# Patient Record
Sex: MALE | Race: WHITE | Employment: OTHER | URBAN - METROPOLITAN AREA
[De-identification: names, ages, dates, MRNs, and addresses within clinical notes are randomized per-mention and may not be internally consistent; named-entity substitution may affect disease eponyms.]

---

## 2019-09-20 ENCOUNTER — APPOINTMENT (OUTPATIENT)
Dept: GENERAL RADIOLOGY | Age: 64
End: 2019-09-20
Attending: EMERGENCY MEDICINE
Payer: MEDICARE

## 2019-09-20 ENCOUNTER — HOSPITAL ENCOUNTER (OUTPATIENT)
Age: 64
Setting detail: OUTPATIENT SURGERY
Discharge: HOME | End: 2019-09-20
Attending: EMERGENCY MEDICINE | Admitting: EMERGENCY MEDICINE
Payer: MEDICARE

## 2019-09-20 ENCOUNTER — ANESTHESIA EVENT (OUTPATIENT)
Dept: ENDOSCOPY | Age: 64
End: 2019-09-20
Payer: MEDICARE

## 2019-09-20 ENCOUNTER — ANESTHESIA (OUTPATIENT)
Dept: ENDOSCOPY | Age: 64
End: 2019-09-20
Payer: MEDICARE

## 2019-09-20 VITALS
BODY MASS INDEX: 32.55 KG/M2 | HEART RATE: 75 BPM | RESPIRATION RATE: 18 BRPM | SYSTOLIC BLOOD PRESSURE: 107 MMHG | WEIGHT: 233.4 LBS | OXYGEN SATURATION: 97 % | TEMPERATURE: 98.6 F | DIASTOLIC BLOOD PRESSURE: 51 MMHG

## 2019-09-20 DIAGNOSIS — T18.9XXA SWALLOWED FOREIGN BODY, INITIAL ENCOUNTER: Primary | ICD-10-CM

## 2019-09-20 PROCEDURE — 76060000031 HC ANESTHESIA FIRST 0.5 HR: Performed by: INTERNAL MEDICINE

## 2019-09-20 PROCEDURE — 99285 EMERGENCY DEPT VISIT HI MDM: CPT

## 2019-09-20 PROCEDURE — 76040000019: Performed by: INTERNAL MEDICINE

## 2019-09-20 PROCEDURE — 77030007290 HC DEV RTVR RTH STRC -G: Performed by: INTERNAL MEDICINE

## 2019-09-20 PROCEDURE — 74019 RADEX ABDOMEN 2 VIEWS: CPT

## 2019-09-20 PROCEDURE — 77030039961 HC KT CUST COLON BSC -D: Performed by: INTERNAL MEDICINE

## 2019-09-20 RX ORDER — SODIUM CHLORIDE, SODIUM LACTATE, POTASSIUM CHLORIDE, CALCIUM CHLORIDE 600; 310; 30; 20 MG/100ML; MG/100ML; MG/100ML; MG/100ML
INJECTION, SOLUTION INTRAVENOUS
Status: DISCONTINUED | OUTPATIENT
Start: 2019-09-20 | End: 2019-09-20 | Stop reason: HOSPADM

## 2019-09-20 RX ORDER — LIDOCAINE HYDROCHLORIDE 20 MG/ML
INJECTION, SOLUTION EPIDURAL; INFILTRATION; INTRACAUDAL; PERINEURAL AS NEEDED
Status: DISCONTINUED | OUTPATIENT
Start: 2019-09-20 | End: 2019-09-20 | Stop reason: HOSPADM

## 2019-09-20 RX ORDER — PROPOFOL 10 MG/ML
INJECTION, EMULSION INTRAVENOUS AS NEEDED
Status: DISCONTINUED | OUTPATIENT
Start: 2019-09-20 | End: 2019-09-20 | Stop reason: HOSPADM

## 2019-09-20 RX ADMIN — PROPOFOL 100 MG: 10 INJECTION, EMULSION INTRAVENOUS at 13:03

## 2019-09-20 RX ADMIN — SODIUM CHLORIDE, SODIUM LACTATE, POTASSIUM CHLORIDE, CALCIUM CHLORIDE: 600; 310; 30; 20 INJECTION, SOLUTION INTRAVENOUS at 12:56

## 2019-09-20 RX ADMIN — PROPOFOL 50 MG: 10 INJECTION, EMULSION INTRAVENOUS at 13:05

## 2019-09-20 RX ADMIN — PROPOFOL 20 MG: 10 INJECTION, EMULSION INTRAVENOUS at 13:06

## 2019-09-20 RX ADMIN — LIDOCAINE HYDROCHLORIDE 60 MG: 20 INJECTION, SOLUTION EPIDURAL; INFILTRATION; INTRACAUDAL; PERINEURAL at 13:03

## 2019-09-20 NOTE — ED TRIAGE NOTES
Per EMS- pt was at the dentist undergoing an implant procedure when the drill bit fell off the dentists drill and the pt swallowed it. Pt in no resp distress and reports no pain.

## 2019-09-20 NOTE — ANESTHESIA PREPROCEDURE EVALUATION
Relevant Problems   No relevant active problems       Anesthetic History   No history of anesthetic complications            Review of Systems / Medical History  Patient summary reviewed, nursing notes reviewed and pertinent labs reviewed    Pulmonary            Asthma : well controlled       Neuro/Psych   Within defined limits           Cardiovascular    Hypertension: well controlled        Dysrhythmias       Exercise tolerance: >4 METS     GI/Hepatic/Renal                Endo/Other    Diabetes: well controlled, type 2         Other Findings              Physical Exam    Airway  Mallampati: III  TM Distance: < 4 cm  Neck ROM: normal range of motion        Cardiovascular  Regular rate and rhythm,  S1 and S2 normal,  no murmur, click, rub, or gallop  Rhythm: regular  Rate: normal         Dental  No notable dental hx       Pulmonary  Breath sounds clear to auscultation               Abdominal  Abdominal exam normal       Other Findings            Anesthetic Plan    ASA: 3  Anesthesia type: MAC          Induction: Intravenous  Anesthetic plan and risks discussed with: Patient

## 2019-09-20 NOTE — DISCHARGE INSTRUCTIONS
Patient Education     Patient armband removed and given to patient to take home. Patient was informed of the privacy risks if armband lost or stolen       Upper GI Endoscopy: What to Expect at [address] [place] [address]  After you have an endoscopy, you will stay at the hospital or clinic for 1 to 2 hours. This will allow the medicine to wear off. You will be able to go home after your doctor or nurse checks to make sure you are not having any problems. You may have to stay overnight if you had treatment during the test. You may have a sore throat for a day or two after the test.  This care sheet gives you a general idea about what to expect after the test.  How can you care for yourself at home? Activity  · Rest as much as you need to after you go home. · You should be able to go back to your usual activities the day after the test.  Diet  · Follow your doctor's directions for eating after the test.  · Drink plenty of fluids (unless your doctor has told you not to). Medications  · If you have a sore throat the day after the test, use an over-the-counter spray to numb your throat. Follow-up care is a key part of your treatment and safety. Be sure to make and go to all appointments, and call your doctor if you are having problems. It's also a good idea to know your test results and keep a list of the medicines you take. When should you call for help? Call 911 anytime you think you may need emergency care.  For example, call if:    · You passed out (loses consciousness).     · You have trouble breathing.     · You pass maroon or bloody stools.    Call your doctor now or seek immediate medical care if:    · You have pain that does not get better after your take pain medicine.     · You have new or worse belly pain.     · You have blood in your stools.     · You are sick to your stomach and cannot keep fluids down.     · You have a fever.     · You cannot pass stools or gas.    Watch closely for changes in your health, and be sure to contact your doctor if:    · Your throat still hurts after a day or two.     · You do not get better as expected. Where can you learn more? Go to http://kelly-jocelyn.info/. Enter [phone] in the search box to learn more about \"Upper GI Endoscopy: What to Expect at Home. \"  Current as of: November 7, 2018  Content Version: 12.2  © 4228-3991 [employer]. Care instructions adapted under license by [employer] (which disclaims liability or warranty for this information). If you have questions about a medical condition or this instruction, always ask your healthcare professional. [place] [address] any warranty or liability for your use of this information. DISCHARGE SUMMARY from Nurse    PATIENT INSTRUCTIONS:    After general anesthesia or intravenous sedation, for 24 hours or while taking prescription Narcotics:  · Limit your activities  · Do not drive and operate hazardous machinery  · Do not make important personal or business decisions  · Do  not drink alcoholic beverages  · If you have not urinated within 8 hours after discharge, please contact your surgeon on call. Report the following to your surgeon:  · Excessive pain, swelling, redness or odor of or around the surgical area  · Temperature over 100.5  · Nausea and vomiting lasting longer than 4 hours or if unable to take medications  · Any signs of decreased circulation or nerve impairment to extremity: change in color, persistent  numbness, tingling, coldness or increase pain  · Any questions    What to do at Home:  Recommended activity: Activity as tolerated and no driving for today,       *  Please give a list of your current medications to your Primary Care Provider. *  Please update this list whenever your medications are discontinued, doses are      changed, or new medications (including over-the-counter products) are added.     *  Please carry medication information at all times in case of emergency situations. These are general instructions for a healthy lifestyle:    No smoking/ No tobacco products/ Avoid exposure to second hand smoke  Surgeon General's Warning:  Quitting smoking now greatly reduces serious risk to your health. Obesity, smoking, and sedentary lifestyle greatly increases your risk for illness    A healthy diet, regular physical exercise & weight monitoring are important for maintaining a healthy lifestyle    You may be retaining fluid if you have a history of heart failure or if you experience any of the following symptoms:  Weight gain of 3 pounds or more overnight or 5 pounds in a week, increased swelling in our hands or feet or shortness of breath while lying flat in bed. Please call your doctor as soon as you notice any of these symptoms; do not wait until your next office visit. The discharge information has been reviewed with the patient. The patient verbalized understanding. Discharge medications reviewed with the patient and appropriate educational materials and side effects teaching were provided.   ___________________________________________________________________________________________________________________________________

## 2019-09-20 NOTE — ED PROVIDER NOTES
[address] [facility]  EMERGENCY DEPARTMENT HISTORY AND PHYSICAL EXAM       Date: 9/20/2019   Patient Name: Donn Diaz   YOB: 1955  Medical Record Number: [id number]    HISTORY OF PRESENTING ILLNESS:     Donn Diaz is a 75 y.o. male presenting with the noted PMH to the ED c/o suspected swallowing a drill bit just prior to arrival.  Patient was having an implant worked on by his oral surgeon Dr. Nithya Bower when a drill bit was accidentally dropped in his mouth. Pt coughed and the piece could not be recovered so they suspected he swallowed it. Primary Care Provider: Agapito Nichols MD   Specialist:    Past Medical History:   Past Medical History:   Diagnosis Date    Asthma     Bed wetting     Colon adenoma     Colon polyps     Constipation     Diabetes ([address]     Diarrhea     Gout     HTN (hypertension)     Kidney stone     RBBB (right bundle branch block with left anterior fascicular block)     Recurrent iritis         Past Surgical History:   Past Surgical History:   Procedure Laterality Date    CYSTOSCOPY,INSERT URETERAL STENT  03/24/2014    kidney Stone Removal    HX ENDOSCOPY      HX TONSIL AND ADENOIDECTOMY  1962        Social History:   Social History     Tobacco Use    Smoking status: Never Smoker    Smokeless tobacco: Never Used   Substance Use Topics    Alcohol use: Yes     Comment: \"rarely\"    Drug use: No        Allergies: Allergies   Allergen Reactions    Lisinopril Cough        REVIEW OF SYSTEMS:  Review of Systems   Constitutional: Negative for chills and fever. HENT: Negative for ear pain and sore throat. Eyes: Negative for pain and visual disturbance. Respiratory: Negative for cough and shortness of breath. Cardiovascular: Negative for chest pain and palpitations. Gastrointestinal: Negative for abdominal pain, diarrhea, nausea and vomiting. Suspected FB ingestion    Genitourinary: Negative for flank pain. Musculoskeletal: Negative for back pain and neck pain. Neurological: Negative for syncope and headaches. Psychiatric/Behavioral: Negative for agitation. The patient is not nervous/anxious. PHYSICAL EXAM:  Vitals:    09/20/19 1100 09/20/19 1200 09/20/19 1317 09/20/19 1319   BP: 137/82 135/60 107/61 107/51   Pulse: 76  80 75   Resp:   14 18   Temp:       SpO2: 98% 99% 100% 97%   Weight:           Physical Exam   Constitutional: He is oriented to person, place, and time. He appears well-developed and well-nourished. No distress. HENT:   Head: Normocephalic and atraumatic. Mouth/Throat: Oropharynx is clear and moist.   Eyes: Pupils are equal, round, and reactive to light. Conjunctivae and EOM are normal. No scleral icterus. Neck: Normal range of motion. Neck supple. No tracheal deviation present. Cardiovascular: Normal rate, regular rhythm and intact distal pulses. No murmur heard. Pulmonary/Chest: Effort normal and breath sounds normal. No respiratory distress. Abdominal: Soft. He exhibits no distension. There is no tenderness. There is no rebound and no guarding. Musculoskeletal: Normal range of motion. He exhibits no edema or deformity. Neurological: He is alert and oriented to person, place, and time. No cranial nerve deficit. No gross neuro deficit   Skin: Skin is warm and dry. No rash noted. He is not diaphoretic. Psychiatric: He has a normal mood and affect. Nursing note and vitals reviewed. Medications - No data to display    RESULTS:    Labs -   Labs Reviewed - No data to display    Radiologic Studies -  Xr Abd Flat/ Erect    Result Date: 9/20/2019  EXAM: XR ABD FLAT/ ERECT INDICATION: swallowed drill bit COMPARISON: None. FINDINGS: Supine and upright views of the abdomen demonstrate a normal gas pattern. There is no free intraperitoneal air. There is a 26 mm radiopaque foreign object projecting over the stomach in keeping with the provided history.  The bones and soft tissues are within normal limits. IMPRESSION: 1. Radiographic correlate for swallowed drill bit. 2. No superimposed acute abnormality. MEDICAL DECISION MAKING    75 y.o. male with noted past medical history who presented with suspected FB ingestion of a drill bit. Will obtain imaging to better ascertain location. Pt comfortable on exam, no distress. ED Course as of Sep 21 1042   Fri Sep 20, 2019   1205 Case discussed with Dr. Natalia Morales. Explained to him that the patient has a slightly larger than 2 cm sharp metal drillbit located in his stomach on plain films and requires emergent EGD. He states he cannot get to the emergency department until 1:30 PM because he is in clinic, but will come as soon as he can. [KG]      ED Course User Index  [KG] Alida Seats R, DO     Pt taken to endoscopy sweet around 1230.     Diagnosis   Clinical Impression: Swallowed Foreign Body    Follow-up Information     Follow up With Specialties Details Why Contact Info    Zaire Cobos MD Internal Medicine   [address]  [phone]            Discharge Medication List as of 9/20/2019  1:29 PM          _______________________________   Attestations:

## 2019-09-20 NOTE — H&P
History and Physical    Referring Physician: Carley Coleman Date: 9/20/2019       Assessment & Recommendations:     Foreign Body ingestion - EGD for removal.     Subjective:     Chief Complaint: Accidental foreign body ingestion    History of Present Illness: Tanner Rodríguez is a 75 y.o. male who is seen in consultation for accidental foreign body ingestion today while at the dentist. Patient swallowed a drill bit while undergoing a dental procedure. Denies chest pain, abdominal pain. In the ED the bit was in the stomach. Past Medical History:   Diagnosis Date    Asthma     Bed wetting     Colon adenoma     Colon polyps     Constipation     Diabetes (HCC)     Diarrhea     Gout     HTN (hypertension)     Kidney stone     RBBB (right bundle branch block with left anterior fascicular block)     Recurrent iritis       Past Surgical History:   Procedure Laterality Date    CYSTOSCOPY,INSERT URETERAL STENT  03/24/2014    kidney Stone Removal    HX ENDOSCOPY      HX TONSIL AND ADENOIDECTOMY  1962     Family History   Problem Relation Age of Onset    Cancer Unknown     Diabetes Unknown       Social History     Tobacco Use    Smoking status: Never Smoker    Smokeless tobacco: Never Used   Substance Use Topics    Alcohol use: Yes     Comment: \"rarely\"       Prior to Admission medications    Medication Sig Start Date End Date Taking? Authorizing Provider   atenolol (TENORMIN) 50 mg tablet Take 50 mg by mouth daily. Other, MD Alberto   oxyCODONE-acetaminophen (PERCOCET) 5-325 mg per tablet Take 1 Tab by mouth every four (4) hours as needed for Pain. Max Daily Amount: 6 Tabs. 3/31/16   Jyoti Kenny MD   ondansetron (ZOFRAN ODT) 4 mg disintegrating tablet Take 1 Tab by mouth every eight (8) hours as needed for Nausea. 3/31/16   Jyoti Kenny MD   allopurinol (ZYLOPRIM) 300 mg tablet 300 mg.  Take 1 Tab by Mouth Once a Day. 1/20/14   Provider, Historical   losartan-hydrochlorothiazide (HYZAAR) 50-12.5 mg per tablet Take 1 Tab by Mouth Once a Day. 1/20/14   Provider, Historical     Allergies   Allergen Reactions    Lisinopril Cough        Review of Systems:  A detailed 10 organ review of systems is obtained with pertinent positives as listed in the History of Present Illness and Past Medical History. All others are negative. Objective: Intake and Output:    No intake/output data recorded. No intake/output data recorded. Physical Exam:   General: NAD  Skin:  Extremities and face reveal no rashes. HEENT: Sclerae anicteric. No oral ulcers. No abnormal pigmentation of the lips. The neck is supple. Cardiovascular: Regular rate and rhythm. No murmurs, gallops, or rubs. PMI nondisplaced. Carotids without bruits. Respiratory:  Comfortable breathing with no accessory muscle use. Clear breath sounds with no wheezes, rales, or rhonchi. GI:  Abdomen nondistended, soft, and nontender. Normal active bowel sounds. Rectal:  Deferred  Musculoskeletal:  No pitting edema of the lower legs. Neurological:  Gross memory appears intact. Patient is alert and oriented. Psychiatric:  Mood appears appropriate with judgement intact. Lymphatic:  No cervical or supraclavicular adenopathy. Data Review:   No results found for this or any previous visit (from the past 24 hour(s)).       Signed By: Cyndy Weaver MD     September 20, 2019

## 2019-09-20 NOTE — PROCEDURES
Endoscopy Procedure Note    Patient: Lilian Ba MRN: [id number]  SSN: [id number]    YOB: 1955  Age: 75 y.o. Sex: male      Date/Time:  9/20/2019 1:09 PM    Esophagogastroduodenoscopy (EGD) Procedure Note    Procedure: Esophagogastroduodenoscopy with foreign body retrieval    IMPRESSION:   1. Normal esophagus  2. Drill bit noted in body of stomach. Kareen Herrlich net used to retrieve it. 3. Moderate food in stomach. Limited exam    RECOMMENDATIONS:  1. Resume regular diet. 2. Resume diet and medications  3. Ok to discharge today. Indication: Foreign Body ingestion  :  Lito Ozuna MD  Assistants: Endoscopy Technician-1: Vic Kaufman CNA  Endoscopy RN-1: Oscar Shrestha RN    Referring Provider:   Julita Salgado MD  History: The history and physical exam were reviewed and updated. Endoscope: Olympus [id number]-190 diagnostic endoscope  Extent of Exam: stomach  ASA: ASA 2 - Patient with mild systemic disease with no functional limitations  Anethesia/Sedation:  MAC anesthesia Propofol    Description of the procedure: The procedure was discussed with the patient including risks, benefits, alternatives including risks of iv sedation, bleeding, perforation and aspiration. A safety timeout was performed. The patient was placed in the left lateral decubitus position. A bite block was placed. The patient was given incremental doses of intravenous sedation until moderate sedation was achieved. The patients vital signs were monitored at all times including heart rate/rhythm, blood pressure and oxygen saturation. The endoscope was then passed under direct visualization to the stomach. The endoscope was then slowly withdrawn while visualizing the mucosa. In the stomach a retroflexion was performed and gastric fundus and cardia visualized. The endoscope was then slowly withdrawn. The patient was then transferred to recovery in stable condition. Findings:   1.  Normal esophagus  2. Drill bit noted in body of stomach. Nini Bustle net used to retrieve it. 3. Moderate food in stomach. Limited exam    Specimens: foreign body           Complications:   None; patient tolerated the procedure well.     EBL:Minimal    Discharge disposition:  Home in the company of  when able to ambulate    Suzy Crouch MD  September 20, 2019  1:09 PM

## (undated) DEVICE — Device

## (undated) DEVICE — FLEX ADVANTAGE 1500CC: Brand: FLEX ADVANTAGE

## (undated) DEVICE — SYR 50ML SLIP TIP NSAF LF STRL --

## (undated) DEVICE — KIT COLON W/ 1.1OZ LUB AND 2 END

## (undated) DEVICE — BITE BLK ENDOSCP AD 54FR GRN POLYETH ENDOSCP W STRP SLD

## (undated) DEVICE — TUBING, SUCTION, 3/16" X 6', STRAIGHT: Brand: MEDLINE

## (undated) DEVICE — BASIN EMESIS 500CC ROSE 250/CS 60/PLT: Brand: MEDEGEN MEDICAL PRODUCTS, LLC

## (undated) DEVICE — MEDI-VAC NON-CONDUCTIVE SUCTION TUBING: Brand: CARDINAL HEALTH

## (undated) DEVICE — KENDALL 500 SERIES DIAPHORETIC FOAM MONITORING ELECTRODE - TEAR DROP SHAPE ( 30/PK): Brand: KENDALL